# Patient Record
Sex: MALE | Race: WHITE | NOT HISPANIC OR LATINO | Employment: STUDENT | URBAN - METROPOLITAN AREA
[De-identification: names, ages, dates, MRNs, and addresses within clinical notes are randomized per-mention and may not be internally consistent; named-entity substitution may affect disease eponyms.]

---

## 2017-02-11 ENCOUNTER — HOSPITAL ENCOUNTER (EMERGENCY)
Facility: HOSPITAL | Age: 27
Discharge: HOME | End: 2017-02-11
Attending: EMERGENCY MEDICINE

## 2017-02-11 VITALS
BODY MASS INDEX: 31.39 KG/M2 | DIASTOLIC BLOOD PRESSURE: 81 MMHG | HEART RATE: 87 BPM | TEMPERATURE: 99 F | WEIGHT: 200 LBS | SYSTOLIC BLOOD PRESSURE: 173 MMHG | OXYGEN SATURATION: 99 % | HEIGHT: 67 IN | RESPIRATION RATE: 18 BRPM

## 2017-02-11 DIAGNOSIS — T78.1XXA ALLERGIC REACTION TO FOOD, INITIAL ENCOUNTER: Primary | ICD-10-CM

## 2017-02-11 PROCEDURE — 63600175 PHARM REV CODE 636 W HCPCS: Performed by: PHYSICIAN ASSISTANT

## 2017-02-11 PROCEDURE — 25000003 PHARM REV CODE 250: Performed by: PHYSICIAN ASSISTANT

## 2017-02-11 PROCEDURE — 96372 THER/PROPH/DIAG INJ SC/IM: CPT

## 2017-02-11 PROCEDURE — 99283 EMERGENCY DEPT VISIT LOW MDM: CPT | Mod: 25

## 2017-02-11 RX ORDER — DEXAMETHASONE SODIUM PHOSPHATE 4 MG/ML
8 INJECTION, SOLUTION INTRA-ARTICULAR; INTRALESIONAL; INTRAMUSCULAR; INTRAVENOUS; SOFT TISSUE
Status: COMPLETED | OUTPATIENT
Start: 2017-02-11 | End: 2017-02-11

## 2017-02-11 RX ORDER — FAMOTIDINE 20 MG/1
20 TABLET, FILM COATED ORAL
Status: COMPLETED | OUTPATIENT
Start: 2017-02-11 | End: 2017-02-11

## 2017-02-11 RX ORDER — EPINEPHRINE 0.3 MG/.3ML
1 INJECTION SUBCUTANEOUS
Qty: 1 DEVICE | Refills: 0 | Status: SHIPPED | OUTPATIENT
Start: 2017-02-11 | End: 2018-02-11

## 2017-02-11 RX ORDER — DIPHENHYDRAMINE HYDROCHLORIDE 50 MG/ML
25 INJECTION INTRAMUSCULAR; INTRAVENOUS
Status: COMPLETED | OUTPATIENT
Start: 2017-02-11 | End: 2017-02-11

## 2017-02-11 RX ADMIN — FAMOTIDINE 20 MG: 20 TABLET, FILM COATED ORAL at 09:02

## 2017-02-11 RX ADMIN — DEXAMETHASONE SODIUM PHOSPHATE 8 MG: 4 INJECTION, SOLUTION INTRAMUSCULAR; INTRAVENOUS at 10:02

## 2017-02-11 RX ADMIN — DIPHENHYDRAMINE HYDROCHLORIDE 25 MG: 50 INJECTION, SOLUTION INTRAMUSCULAR; INTRAVENOUS at 09:02

## 2017-02-11 NOTE — ED AVS SNAPSHOT
[facility]  [place] [address]               Irene Arteaga   2017  8:57 PM   ED    Description:  Male : 1990   Department:  [facility]           Your Care was Coordinated By:     Provider Role From To    Demond Diana Jr., MD Attending Provider 17 --    Jarocho Rivera PA-C Physician Assistant 17 --      Reason for Visit     Allergic Reaction           Diagnoses this Visit        Comments    Allergic reaction to food, initial encounter    -  Primary       ED Disposition     ED Disposition Condition Comment    Discharge             To Do List           Follow-up Information     Go to [facility].    Specialty:  Emergency Medicine    Why:  If symptoms worsen    Contact information:    [place] [id number] [phone]       These Medications        Disp Refills Start End    epinephrine (EPIPEN) 0.3 mg/0.3 mL AtIn 1 Device 0 2017    Inject 0.3 mLs (0.3 mg total) into the muscle as needed. - Intramuscular      [facility] On Call     [facility] On Call Nurse Care Line - / Assistance  Registered nurses in the [facility] provide clinical advisement, health education, appointment booking, and other advisory services.  Call for this free service at [phone].             Medications           Message regarding Medications     Verify the changes and/or additions to your medication regime listed below are the same as discussed with your clinician today.  If any of these changes or additions are incorrect, please notify your healthcare provider.        START taking these NEW medications        Refills    epinephrine (EPIPEN) 0.3 mg/0.3 mL AtIn 0    Sig: Inject 0.3 mLs (0.3 mg total) into the muscle as needed.    Class: Print    Route: Intramuscular      These medications were administered today        Dose Freq    diphenhydrAMINE  "injection 25 mg 25 mg ED 1 Time    Sig: Inject 0.5 mLs (25 mg total) into the muscle ED 1 Time.    Class: Normal    Route: Intramuscular    Cosign for Ordering: Accepted by Demond Diana Jr., MD on 2/11/2017 10:32 PM    famotidine tablet 20 mg 20 mg ED 1 Time    Sig: Take 1 tablet (20 mg total) by mouth ED 1 Time.    Class: Normal    Route: Oral    Cosign for Ordering: Accepted by Demond Diana Jr., MD on 2/11/2017 10:32 PM    dexamethasone injection 8 mg 8 mg ED 1 Time    Sig: Inject 2 mLs (8 mg total) into the muscle ED 1 Time.    Class: Normal    Route: Intramuscular    Cosign for Ordering: Accepted by Demond Diana Jr., MD on 2/11/2017 10:32 PM           Verify that the below list of medications is an accurate representation of the medications you are currently taking.  If none reported, the list may be blank. If incorrect, please contact your healthcare provider. Carry this list with you in case of emergency.           Current Medications     epinephrine (EPIPEN) 0.3 mg/0.3 mL AtIn Inject 0.3 mLs (0.3 mg total) into the muscle as needed.           Clinical Reference Information           Your Vitals Were     BP Pulse Temp Resp Height Weight    173/81 (BP Location: Right arm, Patient Position: Sitting) 87 98.9 °F (37.2 °C) (Oral) 18 5' 7" (1.702 m) 90.7 kg (200 lb)    SpO2 BMI             99% 31.32 kg/m2         Allergies as of 2/11/2017     No Known Allergies      Immunizations Administered on Date of Encounter - 2/11/2017     None      ED Micro, Lab, POCT     None      ED Imaging Orders     None        Discharge Instructions       Please take Benadryl if you feel symptoms return.  Return to the emergency department if you have shortness of breath.    Discharge References/Attachments     FOOD ALLERGY (ENGLISH)      [facility] Sign-Up     Activating your [facility] account is as easy as 1-2-3!     1) Visit [email or web address], select Sign Up Now, enter this activation code and your date of birth, then " select Next.  [email or web address]  Expires: 3/28/2017 10:28 PM      2) Create a username and password to use when you visit [facility] in the future and select a security question in case you lose your password and select Next.    3) Enter your e-mail address and click Sign Up!    Additional Information  If you have questions, please e-mail [email or web address] or call [phone] to talk to our [facility] staff. Remember, [facility] is NOT to be used for urgent needs. For medical emergencies, dial 911.         Smoking Cessation     If you would like to quit smoking:   You may be eligible for free services if you are a [place] resident and started smoking cigarettes before September 1, 1988.  Call the [employer] ([employer]) toll free at (810) 561-5009 or (970) 157-5147.   Call 9-813-QUIT-NOW if you do not meet the above criteria.             [facility] complies with applicable Federal civil rights laws and does not discriminate on the basis of race, color, national origin, age, disability, or sex.        Language Assistance Services     ATTENTION: Language assistance services are available, free of charge. Please call [phone].      ATENCIÓN: Si habla sabino, tiene a corea disposición servicios gratuitos de asistencia lingüística. Llame al [phone].     CHÚ Ý: N?u b?n nói Ti?ng Vi?t, có các d?ch v? h? tr? ngôn ng? mi?n phí dành cho b?n. G?i s? [phone].

## 2017-02-12 NOTE — DISCHARGE INSTRUCTIONS
Please take Benadryl if you feel symptoms return.  Return to the emergency department if you have shortness of breath.

## 2017-02-12 NOTE — ED PROVIDER NOTES
"Encounter Date: 2/11/2017    SCRIBE #1 NOTE: I, Opal Herring, am scribing for, and in the presence of,  Jarocho Rivera PA-C. I have scribed the following portions of the note - Other sections scribed: HPI/ROS.       History     Chief Complaint   Patient presents with    Allergic Reaction     States he might be having an allergic reaction to oysters. Has swelling around L eye and a "knot" to L occipital area. Denies resp distress or tightness in chest/throat     Review of patient's allergies indicates:  No Known Allergies  HPI Comments: CC: Allergic Reaction     HPI: This 27 yo M who has no significant PMHx presents to the ED for evaluation of L eye swelling and swelling to the L occipital region s/p allergic reaction to raw oysters at 1900. He began to feel a "bee sting" sensation 20-30 minutes after eating the oysters. The pt states that he has had similar symptoms in the past after eating oysters that lasted for about 2 days. He denies SOB, trouble swallowing, chest pain, N/V/D, rash, and any other associated symptoms.     The history is provided by the patient. No  was used.     History reviewed. No pertinent past medical history.  No past medical history pertinent negatives.  History reviewed. No pertinent past surgical history.  History reviewed. No pertinent family history.  Social History   Substance Use Topics    Smoking status: Current Every Day Smoker    Smokeless tobacco: None    Alcohol use None      Comment: Rare     Review of Systems   Constitutional: Negative for chills and fever.   HENT: Negative for facial swelling and trouble swallowing.    Eyes: Negative for visual disturbance.        (+) swelling, to L eye   Respiratory: Negative for cough and shortness of breath.    Cardiovascular: Negative for chest pain.   Gastrointestinal: Negative for abdominal pain, diarrhea, nausea and vomiting.   Genitourinary: Negative for dysuria and frequency.   Musculoskeletal: Negative for " myalgias.   Skin: Negative for rash.        (+) swelling, to the L occipital area    Neurological: Negative for headaches.       Physical Exam   Initial Vitals   BP Pulse Resp Temp SpO2   02/11/17 2009 02/11/17 2009 02/11/17 2009 02/11/17 2009 02/11/17 2009   173/81 87 18 98.9 °F (37.2 °C) 99 %     Physical Exam    Vitals reviewed.  Constitutional: He appears well-developed and well-nourished. He is not diaphoretic. No distress.   HENT:   Head: Normocephalic and atraumatic.   Right Ear: External ear normal.   Left Ear: External ear normal.   Nose: Nose normal.   There is a area of focal edema to the left parietal region approximately 3cm in diameter.   Eyes: Conjunctivae and EOM are normal. Pupils are equal, round, and reactive to light. Right eye exhibits no discharge. Left eye exhibits no discharge. No scleral icterus.   Mild left periorbital edema, particularly the medial aspect.  No pain with ocular movement.  No conjunctival injection, discharge.   Neck: Normal range of motion. Neck supple.   Cardiovascular: Normal rate, regular rhythm, normal heart sounds and intact distal pulses.   Pulmonary/Chest: Breath sounds normal. No respiratory distress. He has no wheezes. He has no rhonchi. He has no rales. He exhibits no tenderness.   Musculoskeletal: Normal range of motion.   Neurological: He is alert and oriented to person, place, and time.   Skin: Skin is warm and dry. No rash and no abscess noted. No erythema.         ED Course   Procedures  Labs Reviewed - No data to display          Medical Decision Making:   History:   Old Medical Records: I decided to obtain old medical records.    Emergent evaluation a 26-year-old male complaining of focal pruritic edema to the left eye and scalp ×2 hours which occurred approximately 30 minutes after eating oysters.  Patient explains similar symptoms happened last time he ate oysters.  He denies injury, shortness of breath, dizziness, palpitations, generalized rashes.   Presents in no distress with normal vital signs.  He has mild periorbital edema to the left medial periorbital region with no other findings to suggest periorbital or preseptal cellulitis.  This appears to be an ALLERGIC reaction with focal dermatologic features.  I do not think this is anaphylaxis.  Patient treated with Benadryl, Pepcid, and Decadron in the ED.  He was observed in the ED and had improved symptoms.  Patient discharged with return precautions.  Case discussed with Dr. Fracnisco.          Scribe Attestation:   Scribe #1: I performed the above scribed service and the documentation accurately describes the services I performed. I attest to the accuracy of the note.    Attending Attestation:     Physician Attestation Statement for NP/PA:   I discussed this assessment and plan of this patient with the NP/PA, but I did not personally examine the patient. The face to face encounter was performed by the NP/PA.    Other NP/PA Attestation Additions:      Medical Decision Making: This is the emergent evaluation of a 26-year-old male presents the emergency department with edema to the left periorbital area and to small portion of his parietal scalp on the left hand side.I agree with the treatment plan and evaluation as outlined by the midlevel provider.  Patient was recently.  He states he the same symptoms last time he ate oysters.  He has never had any difficulty breathing, wheezing, vomiting, orlightheadedness.  He is not using her EpiPen.  He is not taking any medicines other the symptoms so far.  Patient given Benadryl with good effect.  We'll discharge with the same as well as with EpiPen as a precaution in case he develops anaphylaxis or anaphylactic shock in the future.       Physician Attestation for Scribe:  Physician Attestation Statement for Scribe #1: I, Jarocho Rivera PA-C, reviewed documentation, as scribed by Opal Herring in my presence, and it is both accurate and complete.                 ED  Course     Clinical Impression:   The encounter diagnosis was Allergic reaction to food, initial encounter.          Jarocho Rivera PA-C  02/11/17 [id number]       Demond Diana Jr., MD  02/11/17 [id number]

## 2017-02-12 NOTE — ED TRIAGE NOTES
Ate oysters around around 1800 tonight.  Within 30 minutes patient began with swelling to his left eye and head. -SOB